# Patient Record
Sex: FEMALE | URBAN - METROPOLITAN AREA
[De-identification: names, ages, dates, MRNs, and addresses within clinical notes are randomized per-mention and may not be internally consistent; named-entity substitution may affect disease eponyms.]

---

## 2024-09-24 PROCEDURE — 88307 TISSUE EXAM BY PATHOLOGIST: CPT

## 2024-09-24 PROCEDURE — 88307 TISSUE EXAM BY PATHOLOGIST: CPT | Performed by: PATHOLOGY

## 2024-09-25 ENCOUNTER — LAB REQUISITION (OUTPATIENT)
Dept: LAB | Facility: HOSPITAL | Age: 64
End: 2024-09-25
Payer: MEDICARE

## 2024-09-25 DIAGNOSIS — N99.3 PROLAPSE OF VAGINAL VAULT AFTER HYSTERECTOMY: ICD-10-CM

## 2024-10-02 LAB
LABORATORY COMMENT REPORT: NORMAL
PATH REPORT.FINAL DX SPEC: NORMAL
PATH REPORT.GROSS SPEC: NORMAL
PATH REPORT.RELEVANT HX SPEC: NORMAL
PATH REPORT.TOTAL CANCER: NORMAL
RESIDENT REVIEW: NORMAL

## 2024-10-31 ENCOUNTER — TELEPHONE (OUTPATIENT)
Dept: SURGERY | Facility: CLINIC | Age: 64
End: 2024-10-31
Payer: MEDICARE

## 2024-11-04 ENCOUNTER — TELEPHONE (OUTPATIENT)
Dept: SURGERY | Facility: CLINIC | Age: 64
End: 2024-11-04
Payer: MEDICARE

## 2024-11-04 NOTE — TELEPHONE ENCOUNTER
Spoke with Kelsey.  Referred by Dr. Cruz.  She is requesting appointment for diverticulitis.  She underwent 9/24/2024 surgery for bladder suspension with Dr. Cruz. During the surgery he encountered adhesions, pus, and diverticulitis and had to remove part of the bowel.  She reports that she had a post op CT scan on 10/25/2024.  Most recent colonoscopy was in 2013 at [facility] [phone].  Will call for the operative and path report from surgery.  CT scan images and report and colonoscopy.  Will have the  contact you for the appointment.  Gabriela Serrano RN

## 2024-12-24 NOTE — PROGRESS NOTES
Kelsey Hurst is a 64 year old female referred by Dr. Victorino Cruz for diverticulitis.    She underwent 9/24/2024 surgery for  recurrent vaginal vault prolapse and urinary incontinence. During the surgery he encountered adhesions, pus, and diverticulitis and underwent ileocecectomy with primary anastomosis.         4/18/2023 Colonoscopy to TI  The terminal ileum was entered and was normal.   The prep was good.  In the sigmoid colon she had some mycosis and scattered diverticula.  Random biopsies from the ascending and sigmoid colon were obtained to rule out microscopic colitis, near 10 cm , a 6 mm sessile polyp was snare excised and retried.  Retroflexion showed hemorrhoids, but no other pathology.  Pathology  A. Random colon biopsy:  Fragments of colonic mucosa with mild edema, focal fibrosis and non-specific chronic inflammation. No histologic evidence of microscopic colitis.  Trichrome stain demonstrates normal thickness of the collagen layer.  B. Colon polyp at 10 cm: Tubular adenoma    9/25/2024 Operation by Alma Wooten : Exploratory Laparoscopy, Extensive enterolysis and lysis of adhesions (2 hours) and Intra-op consult with Dr. Victorino Cruz -Intraoperative laparoscopy with ileocecectomy with primary anastomosis  Pathology:  A. Terminal ileum and cecum, Ileocecectomy:  Terminal ileum with patchy active inflammation and acute and organizing serositis.  Colonic mucosa with no significant diagnostic alteration.  Serosal adhesions.  Reactive lymph nodes.    10/25/2024 CT Abd/Pelvis with oral contrast (in PACS under [facility])   Mild apparent narrowing involving the distal/terminal ileum in close proximity to anastomotic sutures without evidence of small bowel obstruction.  No extraluminal extravasation of enteric contrast.  Mild wall thickening involving the sigmoid colon and loops of small bowel in the pelvis with mild ill-defined stranding/fluid are nonspecific and could reflect reactive changes given presence of  adjacent surgical drainage catheter.  Nonspecific enteritis/colitis could be considered.    1.5 cm complex cystic lesion superior spleen.  This is probably benign but is indeterminate based on the current study.  Consider correlation with prior imaging. No prior imaging is available, recommend further evaluation with multi phase MRI or short term follow up with ultrasound or CT in 3-6 months.  6.5 cm simple appearing cyst left kidney which does not require further imaging evaluation.    Past Medical History  Mitral Valve Prolapse  GERD  High Ocular pressure  Vaginal vault prolapse  Cystocele  Rectocele   Stress urinary incontinence    Surgical History  Bladder surgery  Prolapse repair, vagina   JACOB/BSO  Tonsillectomy  Partial Thyroidectomy  Appendectomy     Social History  Smoking:   ETOH:    Family History      Review of Systems  Constitutional: Negative for fever, chills, anorexia, weight loss, malaise     ENMT: Negative for nasal discharge, congestion, ear pain, mouth pain, throat pain     Respiratory: Negative for cough, hemoptysis, wheezing, shortness of breath     Cardiac: Negative for chest pain, dyspnea on exertion, orthopnea, palpitations, syncope, (+)MVP     Gastrointestinal: Negative for nausea, vomiting, diarrhea, constipation, abdominal pain, (+)GERD, (+)DIVERTICULITIS    Genitourinary: Negative for discharge, dysuria, flank pain, frequency, hematuria, (+)VAGINAL PROLAPSE, (+)STRESS URINARY INCONTINENCE , (+)CYSTOCELE    Musculoskeletal: Negative for decreased ROM, pain, swelling, weakness     Neurological: Negative for dizziness, confusion, headache, seizures, syncope     Psychiatric: Negative for mood changes, anxiety, hallucinations, sleep changes, suicidal ideas     Skin: Negative for mass, pain, itching, rash, ulcer     Endocrine: Negative for heat intolerance, cold intolerance, excessive sweating, polyuria, excess thirst     Hematologic/Lymph: Negative for anemia, bruising, easy bleeding, night  sweats, petechiae, history of DVT/PE or cancer     Allergic/Immunologic: Negative for anaphylaxis, itchy/ teary eyes, itching, sneezing, swelling    Physical Exam  Constitutional: Well developed, awake/alert/oriented x3, no distress, alert and cooperative             Eyes: Sclera anicteric, no conjunctival inflammation, conjugate gaze    ENMT: mucous membranes moist, no apparent injury,            Head/Neck: Neck supple, no apparent injury, No JVD, trachea midline, no bruits              Respiratory/Thorax: Patent airways, CTAB, normal breath sounds with good chest expansion, thorax symmetric         Cardiovascular: Regular, rate and rhythm, no murmurs, normal S1 and S2         Gastrointestinal: Nondistended, soft, non-tender, no rebound tenderness or guarding, no masses palpable, no organomegaly, +BS, no bruits               Extremities: normal extremities, no cyanosis edema, contusions or wounds, 2+ femoral pulses B/L              Neurological: alert and oriented x3, normal strength, Normal gait          Lymphatic: No palpable inguinal lymphadenopathy   Psychological: Appropriate mood and behavior         Skin: Warm and dry, no lesions, no rashes                Anorectal:      Impression:      Plan:

## 2024-12-27 RX ORDER — APREPITANT 40 MG/1
CAPSULE ORAL
COMMUNITY
Start: 2024-09-18

## 2024-12-27 RX ORDER — MELOXICAM 7.5 MG/1
1 TABLET ORAL
COMMUNITY
Start: 2024-06-17

## 2024-12-27 RX ORDER — AMOXICILLIN AND CLAVULANATE POTASSIUM 875; 125 MG/1; MG/1
1 TABLET, FILM COATED ORAL
COMMUNITY
Start: 2024-10-03

## 2024-12-27 RX ORDER — METFORMIN HYDROCHLORIDE 500 MG/1
1 TABLET, EXTENDED RELEASE ORAL
COMMUNITY
Start: 2024-05-16

## 2024-12-27 RX ORDER — ESTRADIOL 0.1 MG/G
CREAM VAGINAL
COMMUNITY
Start: 2024-01-06

## 2024-12-27 RX ORDER — ESTRADIOL 0.5 MG/1
1 TABLET ORAL
COMMUNITY
Start: 2024-08-30

## 2024-12-27 RX ORDER — TIMOLOL MALEATE 5 MG/ML
SOLUTION/ DROPS OPHTHALMIC
COMMUNITY
Start: 2024-11-03

## 2024-12-27 RX ORDER — FLUCONAZOLE 200 MG/1
200 TABLET ORAL ONCE
COMMUNITY
Start: 2024-10-13

## 2024-12-27 RX ORDER — HYDROCODONE BITARTRATE AND ACETAMINOPHEN 5; 325 MG/1; MG/1
TABLET ORAL
COMMUNITY
Start: 2024-05-16

## 2024-12-27 RX ORDER — LEVOTHYROXINE SODIUM 75 UG/1
TABLET ORAL
COMMUNITY
Start: 2024-11-03

## 2024-12-27 RX ORDER — BUPRENORPHINE HYDROCHLORIDE 75 UG/1
FILM, SOLUBLE BUCCAL
COMMUNITY
Start: 2024-09-30

## 2024-12-27 RX ORDER — MUPIROCIN 20 MG/G
OINTMENT TOPICAL
COMMUNITY
Start: 2024-05-03

## 2024-12-27 RX ORDER — ONDANSETRON 4 MG/1
4 TABLET, FILM COATED ORAL EVERY 6 HOURS PRN
COMMUNITY
Start: 2024-05-16

## 2024-12-27 RX ORDER — OMEPRAZOLE 40 MG/1
40 CAPSULE, DELAYED RELEASE ORAL
COMMUNITY
Start: 2024-06-10

## 2024-12-27 RX ORDER — ACETAMINOPHEN 500 MG
TABLET ORAL
COMMUNITY
Start: 2024-09-30

## 2024-12-27 RX ORDER — AMOXICILLIN 500 MG/1
CAPSULE ORAL
COMMUNITY
Start: 2024-07-24

## 2024-12-27 RX ORDER — OXYCODONE AND ACETAMINOPHEN 5; 325 MG/1; MG/1
1 TABLET ORAL EVERY 6 HOURS
COMMUNITY
Start: 2024-09-23

## 2025-01-22 ENCOUNTER — APPOINTMENT (OUTPATIENT)
Dept: SURGERY | Facility: CLINIC | Age: 65
End: 2025-01-22
Payer: MEDICARE